# Patient Record
Sex: MALE | Race: WHITE | URBAN - METROPOLITAN AREA
[De-identification: names, ages, dates, MRNs, and addresses within clinical notes are randomized per-mention and may not be internally consistent; named-entity substitution may affect disease eponyms.]

---

## 2021-09-15 ENCOUNTER — TRANSFERRED RECORDS (OUTPATIENT)
Dept: HEALTH INFORMATION MANAGEMENT | Facility: CLINIC | Age: 56
End: 2021-09-15

## 2021-10-13 ENCOUNTER — TRANSFERRED RECORDS (OUTPATIENT)
Dept: HEALTH INFORMATION MANAGEMENT | Facility: CLINIC | Age: 56
End: 2021-10-13

## 2021-10-19 ENCOUNTER — TRANSFERRED RECORDS (OUTPATIENT)
Dept: HEALTH INFORMATION MANAGEMENT | Facility: CLINIC | Age: 56
End: 2021-10-19

## 2021-10-21 ENCOUNTER — TRANSFERRED RECORDS (OUTPATIENT)
Dept: HEALTH INFORMATION MANAGEMENT | Facility: CLINIC | Age: 56
End: 2021-10-21

## 2022-03-08 ENCOUNTER — TRANSFERRED RECORDS (OUTPATIENT)
Dept: HEALTH INFORMATION MANAGEMENT | Facility: CLINIC | Age: 57
End: 2022-03-08
Payer: COMMERCIAL

## 2022-03-09 ENCOUNTER — TRANSCRIBE ORDERS (OUTPATIENT)
Dept: OTHER | Age: 57
End: 2022-03-09

## 2022-03-09 DIAGNOSIS — R51.9 HEADACHE: ICD-10-CM

## 2022-03-09 DIAGNOSIS — H93.12 TINNITUS, LEFT: Primary | ICD-10-CM

## 2022-03-10 ENCOUNTER — MEDICAL CORRESPONDENCE (OUTPATIENT)
Dept: HEALTH INFORMATION MANAGEMENT | Facility: CLINIC | Age: 57
End: 2022-03-10
Payer: COMMERCIAL

## 2022-05-18 ENCOUNTER — OFFICE VISIT (OUTPATIENT)
Dept: OTOLARYNGOLOGY | Facility: CLINIC | Age: 57
End: 2022-05-18
Attending: FAMILY MEDICINE
Payer: COMMERCIAL

## 2022-05-18 ENCOUNTER — OFFICE VISIT (OUTPATIENT)
Dept: AUDIOLOGY | Facility: CLINIC | Age: 57
End: 2022-05-18
Payer: COMMERCIAL

## 2022-05-18 VITALS — DIASTOLIC BLOOD PRESSURE: 119 MMHG | SYSTOLIC BLOOD PRESSURE: 145 MMHG | HEART RATE: 84 BPM

## 2022-05-18 DIAGNOSIS — H90.3 SNHL (SENSORY-NEURAL HEARING LOSS), ASYMMETRICAL: ICD-10-CM

## 2022-05-18 DIAGNOSIS — H93.12 TINNITUS, LEFT: Primary | ICD-10-CM

## 2022-05-18 DIAGNOSIS — H90.3 SNHL (SENSORY-NEURAL HEARING LOSS), ASYMMETRICAL: Primary | ICD-10-CM

## 2022-05-18 PROCEDURE — 99204 OFFICE O/P NEW MOD 45 MIN: CPT | Performed by: OTOLARYNGOLOGY

## 2022-05-18 PROCEDURE — 99207 PR NO CHARGE LOS: CPT | Performed by: AUDIOLOGIST

## 2022-05-18 PROCEDURE — 92557 COMPREHENSIVE HEARING TEST: CPT | Performed by: AUDIOLOGIST

## 2022-05-18 PROCEDURE — 92550 TYMPANOMETRY & REFLEX THRESH: CPT | Performed by: AUDIOLOGIST

## 2022-05-18 RX ORDER — TRAZODONE HYDROCHLORIDE 100 MG/1
TABLET ORAL
COMMUNITY
Start: 2022-05-17

## 2022-05-18 RX ORDER — HYDROXYZINE HYDROCHLORIDE 25 MG/1
TABLET, FILM COATED ORAL
COMMUNITY
Start: 2022-03-08

## 2022-05-18 RX ORDER — ZOLPIDEM TARTRATE 10 MG/1
TABLET ORAL
COMMUNITY
Start: 2022-04-13

## 2022-05-18 ASSESSMENT — ENCOUNTER SYMPTOMS
BRUISES/BLEEDS EASILY: 0
DOUBLE VISION: 0
VOMITING: 0
BLURRED VISION: 0
DIZZINESS: 0
HEARTBURN: 0
HEADACHES: 0
NAUSEA: 0
TINGLING: 0
SINUS PAIN: 0
TREMORS: 0
HEMOPTYSIS: 0
STRIDOR: 0
CONSTITUTIONAL NEGATIVE: 1
COUGH: 0
SORE THROAT: 0
PHOTOPHOBIA: 0

## 2022-05-18 NOTE — PROGRESS NOTES
Chief Complaint   Patient presents with     Consult     Left ear Tinnitus, also headache, onset 10-11 months. Has nose bleed shortly after Tinnitus started.

## 2022-05-18 NOTE — NURSING NOTE
Charles Salomon's chief complaint for this visit includes:  Chief Complaint   Patient presents with     Consult     Left ear Tinnitus, also headache, onset 10-11 months. Has nose bleed shortly after Tinnitus started.      PCP: Mac Gomez    Referring Provider:  Mac Gomez  [facility]  [address] [place] [address]    BP (!) 145/119   Pulse 84   Data Unavailable        Allergies   Allergen Reactions     June Grass Pollen Extract [Gramineae Pollens]          Do you need any medication refills at today's visit?

## 2022-05-18 NOTE — PROGRESS NOTES
HPI    This pleasant patient is having high pitch tinnitus in his left ear for about 10 months. Describes as constant, daily and effects his sleep. Denies any head and neck surgeries, weakness, numbness, tingling, vision issues. No hx of balance issues, vertigo, aural fullness, ear drainage or pain. He recently had a sharp pain in his left ear and ended up in the ER. No pain at this point.      Review of Systems   Constitutional: Negative.    HENT: Positive for hearing loss and tinnitus. Negative for congestion, ear discharge, ear pain, nosebleeds, sinus pain and sore throat.    Eyes: Negative for blurred vision, double vision and photophobia.   Respiratory: Negative for cough, hemoptysis and stridor.    Gastrointestinal: Negative for heartburn, nausea and vomiting.   Skin: Negative.    Neurological: Negative for dizziness, tingling, tremors and headaches.   Endo/Heme/Allergies: Negative for environmental allergies. Does not bruise/bleed easily.         Physical Exam  Vitals reviewed.   Constitutional:       Appearance: Normal appearance.   HENT:      Head: Normocephalic and atraumatic.      Right Ear: Tympanic membrane, ear canal and external ear normal. Decreased hearing noted. No middle ear effusion. There is no impacted cerumen.      Left Ear: Tympanic membrane, ear canal and external ear normal. Decreased hearing noted.  No middle ear effusion. There is no impacted cerumen.      Nose: Septal deviation and congestion present. No mucosal edema or rhinorrhea.      Right Turbinates: Not enlarged or swollen.      Left Turbinates: Not enlarged or swollen.      Mouth/Throat:      Mouth: Mucous membranes are moist.      Pharynx: Oropharynx is clear. Uvula midline.   Eyes:      Extraocular Movements: Extraocular movements intact.      Pupils: Pupils are equal, round, and reactive to light.   Neurological:      Mental Status: He is alert.       A/P  This pleasant patient has bothersome tinnitus in left for approx 10  months. Denies otalgia, aural fullness, otorrhea, and dizziness.  Results: WNL to mild SNHL bilaterally. Worse at several freq. in left. 100% word rec. bilaterally. Tymps WNL. I will see his recent MRI, MRA and CT temporal bone and give him a call. Options were discussed.

## 2022-05-18 NOTE — PROGRESS NOTES
AUDIOLOGY REPORT    SUMMARY: Audiology visit completed. See audiogram for results.    RECOMMENDATIONS: Follow-up with ENT.    Eleonora Hector  Doctor of Audiology  [place] License # [id number]

## 2022-06-06 ENCOUNTER — HOSPITAL ENCOUNTER (INPATIENT)
Dept: GENERAL RADIOLOGY | Facility: CLINIC | Age: 57
Discharge: HOME | End: 2022-06-06
Attending: OTOLARYNGOLOGY
Payer: COMMERCIAL

## 2022-06-06 DIAGNOSIS — H90.3 SNHL (SENSORY-NEURAL HEARING LOSS), ASYMMETRICAL: ICD-10-CM

## 2022-06-06 DIAGNOSIS — H93.12 TINNITUS, LEFT: Primary | ICD-10-CM

## 2022-06-06 DIAGNOSIS — H93.12 TINNITUS, LEFT: ICD-10-CM

## 2022-06-06 PROCEDURE — 70551 MRI BRAIN STEM W/O DYE: CPT | Mod: 26 | Performed by: RADIOLOGY

## 2022-06-14 ENCOUNTER — OFFICE VISIT (OUTPATIENT)
Dept: OTOLARYNGOLOGY | Facility: CLINIC | Age: 57
End: 2022-06-14
Payer: COMMERCIAL

## 2022-06-14 DIAGNOSIS — H90.3 SNHL (SENSORY-NEURAL HEARING LOSS), ASYMMETRICAL: ICD-10-CM

## 2022-06-14 DIAGNOSIS — H93.12 TINNITUS, LEFT: Primary | ICD-10-CM

## 2022-06-14 PROCEDURE — 99214 OFFICE O/P EST MOD 30 MIN: CPT | Performed by: OTOLARYNGOLOGY

## 2022-06-14 RX ORDER — TURMERIC/TURMERIC EXT/PEPR EXT 900-100 MG
CAPSULE ORAL
COMMUNITY
Start: 2022-05-29

## 2022-06-14 RX ORDER — CRANBERRY FRUIT EXTRACT 200 MG
1 CAPSULE ORAL DAILY
COMMUNITY
Start: 2022-06-04

## 2022-06-14 RX ORDER — BUPROPION HYDROCHLORIDE 150 MG/1
TABLET ORAL
COMMUNITY
Start: 2022-05-05

## 2022-06-14 RX ORDER — TRIAMCINOLONE ACETONIDE 55 UG/1
SPRAY, METERED NASAL
COMMUNITY
Start: 2021-08-25

## 2022-06-14 NOTE — NURSING NOTE
Charles Salomon's goals for this visit include:   Chief Complaint   Patient presents with     Tinnitus     Unilateral, no improvement       He requests these members of his care team be copied on today's visit information:     PCP: Mac Gomez    Referring Provider:  No referring provider defined for this encounter.    There were no vitals taken for this visit.    Do you need any medication refills at today's visit? No    Aissatou Yanez RN

## 2022-06-14 NOTE — LETTER
6/14/2022         RE: Charles Salomon  [address]  [place] [address]        Dear Colleague,    Thank you for referring your patient, Charles Salomon, to the [facility]. Please see a copy of my visit note below.    HPI    This pleasant patient is here for the f/u. He continues to have high pitch tinnitus in his left ear. Describes as constant, daily and effects his sleep. Denies any head and neck surgeries, weakness, numbness, tingling, vision issues. No hx of balance issues, vertigo, aural fullness, ear drainage or pain. He recently had a sharp pain in his left ear and ended up in the ER. No pain at this point. His MRI. MRA are within normal limits.      Review of Systems   Constitutional: Negative.    HENT: Positive for hearing loss and tinnitus. Negative for congestion, ear discharge, ear pain, nosebleeds, sinus pain and sore throat.    Eyes: Negative for blurred vision, double vision and photophobia.   Respiratory: Negative for cough, hemoptysis and stridor.    Gastrointestinal: Negative for heartburn, nausea and vomiting.   Skin: Negative.    Neurological: Negative for dizziness, tingling, tremors and headaches.   Endo/Heme/Allergies: Negative for environmental allergies. Does not bruise/bleed easily.         Physical Exam  Vitals reviewed.   Constitutional:       Appearance: Normal appearance.   HENT:      Head: Normocephalic and atraumatic.      Right Ear: Tympanic membrane, ear canal and external ear normal. Decreased hearing noted. No middle ear effusion. There is no impacted cerumen.      Left Ear: Tympanic membrane, ear canal and external ear normal. Decreased hearing noted.  No middle ear effusion. There is no impacted cerumen.      Nose: Septal deviation and congestion present. No mucosal edema or rhinorrhea.      Right Turbinates: Not enlarged or swollen.      Left Turbinates: Not enlarged or swollen.      Mouth/Throat:      Mouth: Mucous membranes are moist.       Pharynx: Oropharynx is clear. Uvula midline.   Eyes:      Extraocular Movements: Extraocular movements intact.      Pupils: Pupils are equal, round, and reactive to light.   Neurological:      Mental Status: He is alert.       A/P  This pleasant patient has bothersome tinnitus in left for approx 10 months. Denies otalgia, aural fullness, otorrhea, and dizziness.  Results: WNL to mild SNHL bilaterally. Worse at several freq. in left. 100% word rec. bilaterally. Tymps WNL. I will see his recent MRI, MRA and CT temporal bone and give him a call. Options were discussed. He will be referred to tinnitus management program.          Again, thank you for allowing me to participate in the care of your patient.        Sincerely,        Charisse Banda MD

## 2022-06-14 NOTE — PROGRESS NOTES
HPI    This pleasant patient is here for the f/u. He continues to have high pitch tinnitus in his left ear. Describes as constant, daily and effects his sleep. Denies any head and neck surgeries, weakness, numbness, tingling, vision issues. No hx of balance issues, vertigo, aural fullness, ear drainage or pain. He recently had a sharp pain in his left ear and ended up in the ER. No pain at this point. His MRI. MRA are within normal limits.      Review of Systems   Constitutional: Negative.    HENT: Positive for hearing loss and tinnitus. Negative for congestion, ear discharge, ear pain, nosebleeds, sinus pain and sore throat.    Eyes: Negative for blurred vision, double vision and photophobia.   Respiratory: Negative for cough, hemoptysis and stridor.    Gastrointestinal: Negative for heartburn, nausea and vomiting.   Skin: Negative.    Neurological: Negative for dizziness, tingling, tremors and headaches.   Endo/Heme/Allergies: Negative for environmental allergies. Does not bruise/bleed easily.         Physical Exam  Vitals reviewed.   Constitutional:       Appearance: Normal appearance.   HENT:      Head: Normocephalic and atraumatic.      Right Ear: Tympanic membrane, ear canal and external ear normal. Decreased hearing noted. No middle ear effusion. There is no impacted cerumen.      Left Ear: Tympanic membrane, ear canal and external ear normal. Decreased hearing noted.  No middle ear effusion. There is no impacted cerumen.      Nose: Septal deviation and congestion present. No mucosal edema or rhinorrhea.      Right Turbinates: Not enlarged or swollen.      Left Turbinates: Not enlarged or swollen.      Mouth/Throat:      Mouth: Mucous membranes are moist.      Pharynx: Oropharynx is clear. Uvula midline.   Eyes:      Extraocular Movements: Extraocular movements intact.      Pupils: Pupils are equal, round, and reactive to light.   Neurological:      Mental Status: He is alert.       A/P  This pleasant patient  has bothersome tinnitus in left for approx 10 months. Denies otalgia, aural fullness, otorrhea, and dizziness.  Results: WNL to mild SNHL bilaterally. Worse at several freq. in left. 100% word rec. bilaterally. Tymps WNL. I will see his recent MRI, MRA and CT temporal bone and give him a call. Options were discussed. He will be referred to tinnitus management program.

## 2022-07-09 ENCOUNTER — HEALTH MAINTENANCE LETTER (OUTPATIENT)
Age: 57
End: 2022-07-09

## 2022-09-04 ENCOUNTER — HEALTH MAINTENANCE LETTER (OUTPATIENT)
Age: 57
End: 2022-09-04

## 2022-09-30 ENCOUNTER — OFFICE VISIT (OUTPATIENT)
Dept: AUDIOLOGY | Facility: CLINIC | Age: 57
End: 2022-09-30
Payer: COMMERCIAL

## 2022-09-30 DIAGNOSIS — H90.3 SENSORINEURAL HEARING LOSS (SNHL) OF BOTH EARS: ICD-10-CM

## 2022-09-30 DIAGNOSIS — H93.12 TINNITUS OF LEFT EAR: Primary | ICD-10-CM

## 2022-09-30 PROCEDURE — 92590 PR HEARING AID EXAM MONAURAL: CPT | Mod: LT | Performed by: AUDIOLOGIST

## 2022-09-30 PROCEDURE — 92625 TINNITUS ASSESSMENT: CPT | Mod: 52 | Performed by: AUDIOLOGIST

## 2022-09-30 PROCEDURE — 92588 EVOKED AUDITORY TST COMPLETE: CPT | Performed by: AUDIOLOGIST

## 2022-09-30 NOTE — PROGRESS NOTES
AUDIOLOGY REPORT-TINNITUS EVALUATION    SUBJECTIVE: Charles Salomon, 56 year old, was seen in Audiology at the [facility] on 9/30/2022 for a tinnitus evaluation. Charles was seen previously on 5/22/2022 and results revealed a normal hearing sloping to a mild sensorineural hearing loss bilaterally. An asymmetry was noted from 4-8 kHz with the left ear poorer. He was cleared medically by Charisse Banda MD  and referred for a tinnitus evaluation. They deny hearing difficulties, dizziness, or aural fullness.      Charles reports experiencing constant left sided tinnitus that is higher pitched and exteremly  bothersome. The patient reports that they first had tinnitus in July 2021 and it was bothersome right away. He has had tinnitus in the past that only lasted for a few seconds. He reports the tinnitus typically does not change in volume or pitch. In the past week, they have been aware of their tinnitus all of the time of the time in with it being bothersome all of the of the time. It is noticeable in all situations, including: sleeping, in a quiet room, during small conversations, at work, outdoors, and in crowds. He reports that nothing helps make the tinnitus less noticeable and does nothing in particular makes it worse. The patient notes that their tinnitus has affected their sleep; it has caused them to sleep poorly, prevented them from falling asleep, and wakes them up at night and average of 1-2 times per night. Charles reports he sleeps poorly and gets an average of about 3-4 hours of sleep per night. He recently started a new medication to see if this will help him sleep better.      He reports a history of both occupational and recreational noise exposure without hearing protection utilized.  He reports regularly taking aspirin. He denies any neck or jaw pain. Charles denies hyperacusis and does not report that sounds are unpleasant or uncomfortable. The reports they  have had counseling for their tinnitus and did not find it helpful. Charles has not tried any additional strategies. Charles reports that the tinnitus has affected their work, home, and social life. The patient has a very stressful job and recently returned to work. He has also a variety of other medical issues recently and various life changes. He reports he wife has been undergoing chemotherapy for stage 4 cancer and they recently became empty nesters. On a scale from 1 to 10 with 1 being not very troublesome and 10 being very troublesome, Charles rates hearing loss at a 0, tinnitus at a 10, and sensitivity to loud sounds at a 2.     OBJECTIVE:    Tinnitus Handicap Inventory (THI): 88/100  indicating Catastrophic perceived impairment (Grade: 5/5)    0-16: Slight or no handicap (Grade 1)  18-36: Mild handicap (Grade 2)  38-56: Moderate handicap (Grade 3)  58-76: Severe handicap (Grade 4)  : Catastrophic handicap (Grade 5)     Distortion product otoacoustic emissions were performed from 1.5 to 10 kHz and were present from 1.5-2 kHz and absent from 2.5-10 kHz bilaterally.    The following test's were completed using circumaural headphones  Pitch Match:  Right ear: Did not test as patient does not typically experience tinnitus in this ear  Left ear: 11.2 kHz, patient also reported that 4 kHz was clos      Loudness Matching:  Right ear: Did not test as patient does not typically experience tinnitus in this ear  Left ear: 20 dB above threshold (95 dB loudness, 75 dB puretone threshold)      White Noise Minimum Masking Level:   Right ear: Did not test as patient does not typically experience tinnitus in this ear  Left ear: 30 dB above threshold  (45 dB minimum masking level, 15 dB masking threshold)      Narrowband Noise Minimum Masking Level:  Right ear: Did not test as patient does not typically experience tinnitus in this ear  Left ear: 25 dB above threshold (90 dB minimum masking level, 65 dB masking threshold)       Uncomfortable Loudness Level:  Right ear: Did not test as patient does not typically experience tinnitus in this ear    Left ear:   85 dB at 0.25 kHz  85 dB at 0.5 kHz  95 dB at 1 kHz  95 dB at 2 kHz  105 dB at 4 kHz  110 dB at 8 kHz     We discussed that tinnitus is very common and that most people experience tinnitus at some point in their lives. It is most often not life threatening. We discussed the potential causes of tinnitus including hearing loss, noise exposure, medication, certain dietary factors, stress, lack of sleep, upper back and neck tension or pain, and dental or jaw issues. We discussed the role of his hearing loss in tinnitus and the effects that lack of sleep can have. We discussed the role of the hair cells, the auditory system, the limbic system and the autonomic nervous system in tinnitus and how they work together to create a feedback loop which can exacerbate the effects of tinnitus.    The goal of our tinnitus management program is to come up with a multi-faceted, individualized treatment plan. I explained that with any type of tinnitus treatment there is no way to cure it but rather treatment is aimed at learning to not focus on the tinnitus in order to break the negative feedback loop.    The start of a tinnitus treatment plan is typically sound therapy, either room-level or ear-level sound generation. Room-level includes sound generators or fan noise which is intended to mask the tinnitus. Ear-level sound generation includes hearing aids and hearing aid maskers. It is important that the sound used to mask the tinnitus does not require attention. Charles is interested in hearing aids or maskers at this time. We discussed styles, levels of technology and monaural vs. binaural fitting. Discussed disposable vs rechargeable batteries and connectivity options. Patient has an iPhone.      The hearing aid(s) mutually chosen were:  Left: Widex Moment 110-R  COLOR: Black Tech   BATTERY SIZE:  rechargeable  EARMOLD/TIPS: Medium open   CANAL/ LENGTH: 1    We also discussed using health psychology or cognitive behavorial therapy strategies to help change how Charles thinks about and reacts to tinnitus when it is bothersome. Some techniques discussed included identifying thought errors and beginning to rephrase those thought errors in regards to tinnitus. We also discussed using distraction as a tactic. Alternative treatments were also briefly discussed including relaxation and deep breathing exercises, massage therapy and acupuncture. Patient was provided with a handout for Health Psychology. Time was spent discussing various apps to try for both masking noises and relaxation/deep breathing. These included; Widex Andrew, ReSound Relief, Haiku Deck Relax, and the Calm stevie. Discussed taking time at work to do a guided mediation or deep breathing exercise. Also discussed using apps to find a noise that is calming or trying pink and brown noise.        ASSESSMENT: A tinnitus evaluation and a monaural hearing aid consultation was performed today. Their questions were answered and Charles reported the information was helpful. Reviewed purchase information and warranty information with patient. The 45 day trial period was explained to patient. The patient was given a copy of the [place] Department of Health consumer brochure on purchasing hearing instruments. Patient risk factors have been provided to the patient in writing prior to the sale of the hearing aid per FDA regulation. The risk factors are also available in the User Instructional Booklet to be presented on the day of the hearing aid fitting. Hearing aid(s) ordered. Hearing aid evaluation completed.      PLAN: It is recommended that Charles will return in a few weeks for a hearing aid/tinnitus masker fitting. In the meantime, he will plan to try some of the apps as discussed. Please call this clinic with questions regarding these results or  recommendations.    Eleonora Earl CCC-A  Licensed Audiologist   [place] #[id number]

## 2022-10-19 ENCOUNTER — OFFICE VISIT (OUTPATIENT)
Dept: AUDIOLOGY | Facility: CLINIC | Age: 57
End: 2022-10-19
Payer: COMMERCIAL

## 2022-10-19 DIAGNOSIS — H93.12 TINNITUS OF LEFT EAR: Primary | ICD-10-CM

## 2022-10-19 DIAGNOSIS — H90.3 SENSORINEURAL HEARING LOSS (SNHL) OF BOTH EARS: ICD-10-CM

## 2022-10-19 PROCEDURE — V5020 CONFORMITY EVALUATION: HCPCS | Performed by: AUDIOLOGIST

## 2022-10-19 PROCEDURE — V5241 DISPENSING FEE, MONAURAL: HCPCS | Mod: LT | Performed by: AUDIOLOGIST

## 2022-10-19 PROCEDURE — V5257 HEARING AID, DIGIT, MON, BTE: HCPCS | Mod: LT | Performed by: AUDIOLOGIST

## 2022-10-19 PROCEDURE — V5011 HEARING AID FITTING/CHECKING: HCPCS | Performed by: AUDIOLOGIST

## 2022-10-19 NOTE — PROGRESS NOTES
AUDIOLOGY REPORT    SUBJECTIVE: Charles Salomon is a 56 year old male who was seen in the Audiology Clinic at the [facility] for a fitting of a left Widex Moment 110-R -in-the-ear hearing aid. Previous results have revealed normal hearing sloping to a mild sensorineural hearing loss bilaterally. An asymmetry was noted from 4-8 kHz with the left ear poorer. The patient's primary concerns is regarding tinnitus.     OBJECTIVE: The hearing aid conformity evaluation was completed.The hearing aids were placed and they provided a good fit. A feedback test and sensogram were completed. Real-ear-probe-microphone measurements and simulated real-ear-probe-microphone measurements were completed on the PageFreezer system and were a good match to NAL-NL1 target with soft sounds audible, moderate sounds comfortable, and loud sounds below discomfort. UCLs are verified through maximum power output measures and demonstrate appropriate limiting of loud inputs. Charles was oriented to proper hearing aid use, care, cleaning (no water, dry brush), batteries (size: rechargeable, insertion/removal, toxicity, low-battery signal), aid insertion/removal, user booklet, warranty information, storage cases, and other hearing aid details. The patient confirmed understanding of hearing aid use and care, and showed proper insertion of hearing aid and batteries while in the office today. Charles reported good volume and sound quality today. The patient reports some microphone quality to his own voice and hearing his own voice louder, but that it is tolerable. He notes that as soon as the hearing aid turned on he noticed a significant reduction in tinnitus.  Hearing aids were programmed as follows:  Program 1: Universal   Push button is currently set for manual power on/off only    EAR(S) FIT: Left  HEARING AID MODEL NAME: Widex Moment 110-R  HEARING AID STYLE: -in-the-ear  "behind-the-ear  EARMOLDS/TIP: small closed   SERIAL NUMBERS: Right: N/A Left: [id number]  WARRANTY END DATE: 11/3/2025    The hearing aids were successfully connected to the patient's iPhone and to the [employer] stevie. They were shown how to utilize the bluetooth to stream audio and phone calls and the stevie was demonstrated. The patient was given additional resources regarding connectivity and bluetooth support to reference if needed. The patient reports he is interested in getting a hearing aid on the right side for bilateral streaming.     Patient was counseled regarding using a mask and wearing hearing aids. It was discussed that the patient should be careful when removing the mask and always check the ears to be sure the hearing aids are still in place following mask removal.  It is recommended that the patient strongly consider the use of an \"ear saver\" which anchors the mask behind the neck if using a mask that typically goes behind the ears, or use a mask that ties behind the head. The patient expressed understanding.    ASSESSMENT: A left Widex Moment 110-R -in-the-ear hearing aid was fit today. Verification measures were performed. Charles signed the Hearing Aid Purchase Agreement and was given a copy, as well as details on his hearing aids. Patient was counseled that exact out of pocket amounts cannot be determined for hearing aid claims being sent to insurance. Any insurance coverage information presented to the patient is an estimate only, and is not a guarantee of payment. Patient has been advised to check with their own insurance. A matching right hearing aid will be ordered per patient's request.     PLAN: Charles will return for follow-up in 2-3 weeks for a hearing aid review appointment. A this time, the right device will be fit. Please call this clinic with questions regarding today s appointment.    Eleonora Earl CCC-A  Licensed Audiologist   [place] #[id number]        "

## 2023-03-21 ENCOUNTER — OFFICE VISIT (OUTPATIENT)
Dept: AUDIOLOGY | Facility: CLINIC | Age: 58
End: 2023-03-21
Payer: COMMERCIAL

## 2023-03-21 DIAGNOSIS — H90.3 SENSORINEURAL HEARING LOSS (SNHL) OF BOTH EARS: Primary | ICD-10-CM

## 2023-03-21 PROCEDURE — V5011 HEARING AID FITTING/CHECKING: HCPCS | Performed by: AUDIOLOGIST

## 2023-03-21 PROCEDURE — V5241 DISPENSING FEE, MONAURAL: HCPCS | Mod: RT | Performed by: AUDIOLOGIST

## 2023-03-21 PROCEDURE — V5257 HEARING AID, DIGIT, MON, BTE: HCPCS | Mod: RT | Performed by: AUDIOLOGIST

## 2023-03-21 PROCEDURE — V5020 CONFORMITY EVALUATION: HCPCS | Performed by: AUDIOLOGIST

## 2023-03-23 NOTE — PROGRESS NOTES
AUDIOLOGY REPORT    SUBJECTIVE: Charles Salomon is a 57 year old male who was seen in the Audiology Clinic at the [facility] for a fitting of a right Widex moment 110-R -in-the-ear hearing aid and a follow up for previous fitting of left hearing aid. Previous results have revealed normal hearing sloping to a mild sensorineural hearing loss bilaterally. The patient seen in this clinic and was fit with a left Widex Moment 110-R -in-the-ear hearing aid on 10/19/22. The patient reports he has been happy with his hearing aid, but was getting some feedback.     OBJECTIVE: Right hearing aid was connected with left hearing aid in software. The hearing aid conformity evaluation was completed.The hearing aids were placed and they provided a good fit. Simulated real-ear-probe-microphone measurements were completed on the [employer] system and were a good match to NAL-NL1 target with soft sounds audible, moderate sounds comfortable, and loud sounds below discomfort. UCLs are verified through maximum power output measures and demonstrate appropriate limiting of loud inputs. Charles was oriented to proper hearing aid use, care, cleaning (no water, dry brush), batteries (size:rechargeable, insertion/removal, toxicity, low-battery signal), aid insertion/removal, user booklet, warranty information, storage cases, and other hearing aid details. The patient confirmed understanding of hearing aid use and care, and showed proper insertion of hearing aid and batteries while in the office today. A new feedback test was completed for both sides and reviewed how to match sure device was properly inserted to prevent feedback. Also discussed larger dome size as needed. Patient would like to keep current small power domes, but was given medium power domes to take home. Charles reported good volume and sound quality today.   Hearing aids were programmed as follows:  Program 1:  "Universal  Program 2: Pure Sound  Program 3: Music  Push button is currently set for manual power on/off only as patient would prefer to use stevie    EAR(S) FIT: Right  HEARING AID MODEL NAME: Widex Moment 110-R  HEARING AID STYLE: -in-the-ear behind-the-ear  EARMOLDS/TIP: Small power  SERIAL NUMBERS: Right: [id number] Left: N/A  WARRANTY END DATE: 4/8/2026    The hearing aids were successfully connected to the patient's iPhone and to the [employer] stevie. Reviewed how to utilize the bluetooth to stream audio and phone calls and the stevie was demonstrated. The patient was given additional resources regarding connectivity and bluetooth support to reference if needed.    Patient was counseled regarding using a mask and wearing hearing aids. It was discussed that the patient should be careful when removing the mask and always check the ears to be sure the hearing aids are still in place following mask removal.  It is recommended that the patient strongly consider the use of an \"ear saver\" which anchors the mask behind the neck if using a mask that typically goes behind the ears, or use a mask that ties behind the head. The patient expressed understanding.    ASSESSMENT: Right Widex moment 110-R -in-the-ear hearing aid(s) were fit today. Verification measures were performed. Charles signed the Hearing Aid Purchase Agreement and was given a copy, as well as details on his hearing aids. Patient was counseled that exact out of pocket amounts cannot be determined for hearing aid claims being sent to insurance. Any insurance coverage information presented to the patient is an estimate only, and is not a guarantee of payment. Patient has been advised to check with their own insurance.    PLAN:Charles will return for follow-up as needed, or at least every 9-12 months for cleaning and assessment of hearing aid.    Please call this clinic with questions regarding today s appointment.    Eleonora Earl CCC-A  Licensed " Audiologist   [place] #[id number]

## 2023-07-23 ENCOUNTER — HEALTH MAINTENANCE LETTER (OUTPATIENT)
Age: 58
End: 2023-07-23

## 2023-10-13 ENCOUNTER — OFFICE VISIT (OUTPATIENT)
Dept: AUDIOLOGY | Facility: CLINIC | Age: 58
End: 2023-10-13

## 2023-10-13 ENCOUNTER — TELEPHONE (OUTPATIENT)
Dept: AUDIOLOGY | Facility: CLINIC | Age: 58
End: 2023-10-13
Payer: COMMERCIAL

## 2023-10-13 DIAGNOSIS — H90.3 SENSORY HEARING LOSS, BILATERAL: Primary | ICD-10-CM

## 2023-10-13 PROCEDURE — V5299 HEARING SERVICE: HCPCS | Performed by: AUDIOLOGIST

## 2023-10-13 NOTE — TELEPHONE ENCOUNTER
Walk-in hearing aid services on 10/13/23: The patient reported his left hearing aid wasn't working.  Examination found the  had failed.  A new  was placed on the left hearing aid under warranty and it subsequently returned to normal function.  The left hearing aid and the patient's replacement right hearing aid (which had just arrived) were programmed together in the fitting software.  The hearing aids were returned to the patient and were re-paired to his iPhone.

## 2024-09-14 ENCOUNTER — HEALTH MAINTENANCE LETTER (OUTPATIENT)
Age: 59
End: 2024-09-14

## 2024-09-19 ENCOUNTER — DOCUMENTATION ONLY (OUTPATIENT)
Dept: AUDIOLOGY | Facility: CLINIC | Age: 59
End: 2024-09-19
Payer: COMMERCIAL

## 2024-09-19 NOTE — PROGRESS NOTES
Walk-in hearing aid services on 9/19/24. The patient reported his left hearing aid wasn't working. Examination determined the  had failed. The hearing aid was cleaned and the  (#1-M) was replaced under warranty. The right hearing aid was cleaned and the  filter and dome were replaced. Afterwards a listening check found the hearing aids to be working properly and they were returned to the patient.